# Patient Record
Sex: FEMALE | Race: WHITE | Employment: UNEMPLOYED | URBAN - METROPOLITAN AREA
[De-identification: names, ages, dates, MRNs, and addresses within clinical notes are randomized per-mention and may not be internally consistent; named-entity substitution may affect disease eponyms.]

---

## 2019-07-23 ENCOUNTER — HOSPITAL ENCOUNTER (EMERGENCY)
Age: 39
Discharge: HOME | End: 2019-07-23
Attending: EMERGENCY MEDICINE | Admitting: EMERGENCY MEDICINE
Payer: MEDICARE

## 2019-07-23 VITALS
SYSTOLIC BLOOD PRESSURE: 173 MMHG | HEART RATE: 70 BPM | DIASTOLIC BLOOD PRESSURE: 98 MMHG | BODY MASS INDEX: 39.68 KG/M2 | HEIGHT: 72 IN | OXYGEN SATURATION: 96 % | TEMPERATURE: 98.1 F | RESPIRATION RATE: 16 BRPM | WEIGHT: 293 LBS

## 2019-07-23 DIAGNOSIS — K08.89 TOOTHACHE: ICD-10-CM

## 2019-07-23 DIAGNOSIS — K02.9 DENTAL CARIES: Primary | ICD-10-CM

## 2019-07-23 PROCEDURE — 99281 EMR DPT VST MAYX REQ PHY/QHP: CPT

## 2019-07-23 RX ORDER — IBUPROFEN 800 MG/1
800 TABLET ORAL EVERY 8 HOURS
Qty: 15 TAB | Refills: 0 | Status: SHIPPED | OUTPATIENT
Start: 2019-07-23 | End: 2019-07-28

## 2019-07-23 RX ORDER — LIDOCAINE HYDROCHLORIDE 20 MG/ML
SOLUTION OROPHARYNGEAL
Qty: 200 ML | Refills: 0 | Status: SHIPPED | OUTPATIENT
Start: 2019-07-23

## 2019-07-23 RX ORDER — OXYCODONE AND ACETAMINOPHEN 5; 325 MG/1; MG/1
1 TABLET ORAL
Qty: 12 TAB | Refills: 0 | Status: SHIPPED | OUTPATIENT
Start: 2019-07-23 | End: 2019-07-26

## 2019-07-23 RX ORDER — CLARITHROMYCIN 500 MG/1
500 TABLET, FILM COATED ORAL 2 TIMES DAILY
Qty: 20 TAB | Refills: 0 | Status: SHIPPED | OUTPATIENT
Start: 2019-07-23 | End: 2019-08-02

## 2019-07-23 NOTE — ED PROVIDER NOTES
EMERGENCY DEPARTMENT HISTORY AND PHYSICAL EXAM    Date: 7/23/2019  Patient Name: Kathia Braun    History of Presenting Illness     Chief Complaint   Patient presents with    Dental Pain         History Provided By: Patient and EMS      Additional History (Context): Kathia Braun is a 37 y.o. female with obesity who presents with right upper and lower dental pain x1 day. Denies trauma. Says she has a dental appointment tomorrow. Is scheduled for a dental extraction next week for her upper 3 teeth and then to be fitted for dentures. Denies fever drooling or trismus. Last menstrual was July 8. PCP: Arron Sterling MD    Current Outpatient Medications   Medication Sig Dispense Refill    lidocaine (LIDOCAINE VISCOUS) 2 % solution Put 10 mL in mouth and swish and spit out QAC and at bedtime 200 mL 0    ibuprofen (MOTRIN) 800 mg tablet Take 1 Tab by mouth every eight (8) hours for 5 days. 15 Tab 0    oxyCODONE-acetaminophen (PERCOCET) 5-325 mg per tablet Take 1 Tab by mouth every six (6) hours as needed for Pain for up to 3 days. Max Daily Amount: 4 Tabs. Take 1 tablet every 6 hours as needed for pain control. 12 Tab 0    clarithromycin (BIAXIN) 500 mg tablet Take 1 Tab by mouth two (2) times a day for 10 days. 20 Tab 0       Past History     Past Medical History:  History reviewed. No pertinent past medical history. Past Surgical History:  History reviewed. No pertinent surgical history. Family History:  History reviewed. No pertinent family history. Social History:  Social History     Tobacco Use    Smoking status: Not on file   Substance Use Topics    Alcohol use: Not on file    Drug use: Not on file       Allergies: Allergies   Allergen Reactions    Pcn [Penicillins] Anaphylaxis         Review of Systems   Review of Systems   Constitutional: Negative for fever. HENT: Positive for dental problem. Negative for drooling.       All Other Systems Negative  Physical Exam     Vitals: 07/23/19 1746   BP: (!) 173/98   Pulse: 70   Resp: 16   Temp: 98.1 °F (36.7 °C)   SpO2: 95%   Weight: 136.1 kg (300 lb)   Height: 6' (1.829 m)     Physical Exam   Constitutional: She is oriented to person, place, and time. She appears well-developed. She appears distressed. HENT:   Head: Normocephalic and atraumatic. Dental: First second premolars are eroded tender and first molar is nearly absent. Lower right second premolar is eroded and tender. No adjacent gingival fluctuance palpated. No drooling or trismus. Eyes: Pupils are equal, round, and reactive to light. Neck: No JVD present. No tracheal deviation present. No thyromegaly present. Cardiovascular: Normal rate, regular rhythm and normal heart sounds. Exam reveals no gallop and no friction rub. No murmur heard. Pulmonary/Chest: Effort normal and breath sounds normal. No stridor. No respiratory distress. She has no wheezes. She has no rales. She exhibits no tenderness. Abdominal: Soft. She exhibits no distension and no mass. There is no tenderness. There is no rebound and no guarding. Musculoskeletal: She exhibits no edema or tenderness. Lymphadenopathy:     She has no cervical adenopathy. Neurological: She is alert and oriented to person, place, and time. Skin: Skin is warm and dry. No rash noted. No erythema. No pallor. Psychiatric: She has a normal mood and affect. Her behavior is normal. Thought content normal.   Nursing note and vitals reviewed. Diagnostic Study Results     Labs -   No results found for this or any previous visit (from the past 12 hour(s)). Radiologic Studies -   No orders to display     CT Results  (Last 48 hours)    None        CXR Results  (Last 48 hours)    None            Medical Decision Making   I am the first provider for this patient. I reviewed the vital signs, available nursing notes, past medical history, past surgical history, family history and social history.     Vital Signs-Reviewed the patient's vital signs. Records Reviewed: Nursing Notes    Procedures:  Procedures    Provider Notes (Medical Decision Making): Treat dental caries pain. Follow-up with dental.    MED RECONCILIATION:  No current facility-administered medications for this encounter. Current Outpatient Medications   Medication Sig    lidocaine (LIDOCAINE VISCOUS) 2 % solution Put 10 mL in mouth and swish and spit out QAC and at bedtime    ibuprofen (MOTRIN) 800 mg tablet Take 1 Tab by mouth every eight (8) hours for 5 days.  oxyCODONE-acetaminophen (PERCOCET) 5-325 mg per tablet Take 1 Tab by mouth every six (6) hours as needed for Pain for up to 3 days. Max Daily Amount: 4 Tabs. Take 1 tablet every 6 hours as needed for pain control.  clarithromycin (BIAXIN) 500 mg tablet Take 1 Tab by mouth two (2) times a day for 10 days. Disposition:  home    DISCHARGE NOTE:   6:02 PM    Pt has been reexamined. Patient has no new complaints, changes, or physical findings. Care plan outlined and precautions discussed. Results of exam were reviewed with the patient. All medications were reviewed with the patient; will d/c home with see below. All of pt's questions and concerns were addressed. Patient was instructed and agrees to follow up with dental, as well as to return to the ED upon further deterioration. Patient is ready to go home.     Follow-up Information     Follow up With Specialties Details Why Contact Info    [facility]  Schedule an appointment as soon as possible for a visit in 1 day  [address]  Schedule an appointment as soon as possible for a visit in 1 day  Danielle Cooper [phone] [facility]  Schedule an appointment as soon as possible for a visit in 1 day  [address] [facility] [id number] [address] [facility] [address] [facility]  Schedule an appointment as soon as possible for a visit in 1 day  [phone] [facility]    [address]  Schedule an appointment as soon as possible for a visit in 1 day  [address] [address]  [phone]    [facility] EMERGENCY DEPT Emergency Medicine  If symptoms worsen return immediately [address]  [phone]          Current Discharge Medication List      START taking these medications    Details   lidocaine (LIDOCAINE VISCOUS) 2 % solution Put 10 mL in mouth and swish and spit out QAC and at bedtime  Qty: 200 mL, Refills: 0      ibuprofen (MOTRIN) 800 mg tablet Take 1 Tab by mouth every eight (8) hours for 5 days. Qty: 15 Tab, Refills: 0      oxyCODONE-acetaminophen (PERCOCET) 5-325 mg per tablet Take 1 Tab by mouth every six (6) hours as needed for Pain for up to 3 days. Max Daily Amount: 4 Tabs. Take 1 tablet every 6 hours as needed for pain control. Qty: 12 Tab, Refills: 0    Associated Diagnoses: Dental caries; Toothache      clarithromycin (BIAXIN) 500 mg tablet Take 1 Tab by mouth two (2) times a day for 10 days. Qty: 20 Tab, Refills: 0             Diagnosis     Clinical Impression:   1. Dental caries    2.  Toothache

## 2019-07-23 NOTE — DISCHARGE INSTRUCTIONS
Patient Education        Tooth and Gum Pain: Care Instructions  Your Care Instructions    The most common causes of dental pain are tooth decay and gum disease. Pain can also be caused by an infection of the tooth (abscess) or the gums. Or you may have pain from a broken or cracked tooth. Other causes of pain include infection and damage to a tooth from nervous grinding of your teeth. A wisdom tooth can be painful when it is coming in but cannot break through the gum. It can also be painful when the tooth is only partway in and extra gum tissue has formed around it. The tissue can get inflamed (pericoronitis), and sometimes it gets infected. Prompt dental care can help find the cause of your toothache and keep the tooth from dying or gum disease from getting worse. Self-care at home may reduce your pain and discomfort. Follow-up care is a key part of your treatment and safety. Be sure to make and go to all appointments, and call your dentist or doctor if you are having problems. It's also a good idea to know your test results and keep a list of the medicines you take. How can you care for yourself at home? · To reduce pain and facial swelling, put an ice or cold pack on the outside of your cheek for 10 to 20 minutes at a time. Put a thin cloth between the ice and your skin. Do not use heat. · If your doctor prescribed antibiotics, take them as directed. Do not stop taking them just because you feel better. You need to take the full course of antibiotics. · Ask your doctor if you can take an over-the-counter pain medicine, such as acetaminophen (Tylenol), ibuprofen (Advil, Motrin), or naproxen (Aleve). Be safe with medicines. Read and follow all instructions on the label. · Avoid very hot, cold, or sweet foods and drinks if they increase your pain. · Rinse your mouth with warm salt water every 2 hours to help relieve pain and swelling. Mix 1 teaspoon of salt in 8 ounces of water.   · Talk to your dentist about using special toothpaste for sensitive teeth. To reduce pain on contact with heat or cold or when brushing, brush with this toothpaste regularly or rub a small amount of the paste on the sensitive area with a clean finger 2 or 3 times a day. Floss gently between your teeth. · Do not smoke or use spit tobacco. Tobacco use can make gum problems worse, decreases your ability to fight infection in your gums, and delays healing. If you need help quitting, talk to your doctor about stop-smoking programs and medicines. These can increase your chances of quitting for good. When should you call for help? Call 911 anytime you think you may need emergency care. For example, call if:    · You have trouble breathing.    Call your dentist or doctor now or seek immediate medical care if:    · You have signs of infection, such as:  ? Increased pain, swelling, warmth, or redness. ? Red streaks leading from the area. ? Pus draining from the area. ? A fever.    Watch closely for changes in your health, and be sure to contact your doctor if:    · You do not get better as expected. Where can you learn more? Go to http://shell-ivon.info/. Enter [id number] [id number] in the search box to learn more about \"Tooth and Gum Pain: Care Instructions. \"  Current as of: October 3, 2018  Content Version: 12.1  © 3764-3415 [employer]. Care instructions adapted under license by [employer] (which disclaims liability or warranty for this information). If you have questions about a medical condition or this instruction, always ask your healthcare professional. [place] [address] any warranty or liability for your use of this information. Patient Education        Tooth Extraction: Care Instructions  Your Care Instructions  Tooth extraction is the complete removal of a tooth, from the part of the tooth that you can see to the roots that are in the jawbone.  Damage caused by tooth decay is the most common reason for a tooth's extraction. Other reasons for removing a tooth include infection or injury. Removing the tooth can help keep an infection from spreading to other parts of the mouth. And some teeth may be removed to prevent or correct crowding in the mouth. Your dentist or an oral surgeon, who specializes in surgeries of the mouth, can remove a tooth. It can be done in the dentist's or oral surgeon's office. The dentist first numbs the area around the tooth. You may also get medicine to help you relax. The dentist uses a special tool to grasp the tooth and lift it out of the tooth socket. You may feel a tug on the tooth as it is being removed. If the tooth breaks while being pulled, or if it doesn't come out in one piece, the dentist uses other tools to remove the rest of the tooth. After the tooth comes out, you will be given a piece of gauze to bite down on. This will help stop bleeding. You may need stitches. You will be told if and when you should come back to have the stitches removed. You may have some pain, bleeding, or swelling afterward. The dentist may give you medicine for pain. The pain should steadily decrease in the days after the extraction. A blood clot will form in the tooth socket after the extraction. The clot protects the bone during healing. If that blood clot gets loose or comes out of the socket, you may have a dry socket, which exposes the bone. A dry socket may last for several days and can cause severe pain. If you get a dry socket, your dentist can treat it with medicine. You and your dentist may want to discuss options to replace the removed tooth. Options include an implant, a denture, or a bridge. Follow-up care is a key part of your treatment and safety. Be sure to make and go to all appointments, and call your dentist if you are having problems. It's also a good idea to know your test results and keep a list of the medicines you take.   How can you care for yourself at home? · While your mouth is numb, be careful not to bite your tongue or the inside of your cheek or lip. · Be safe with medicines. Read and follow all instructions on the label. ? If the dentist gave you a prescription medicine for pain, take it as prescribed. ? If you are not taking a prescription pain medicine, ask your dentist if you can take an over-the-counter medicine. · If your dentist prescribed antibiotics, take them as directed. Do not stop taking them just because you feel better. You need to take the full course of antibiotics. · If you have bleeding, bite gently on a gauze pad. Change the pad as it becomes soaked with blood. · After 24 hours, rinse your mouth gently with warm salt water several times a day. Your dentist may recommend other mouth rinses if needed. Do not rinse hard. This can loosen the blood clot and delay healing. · Avoid rubbing the area with your tongue. And don't use a straw for the first few days. Both of these actions can loosen the blood clot and delay healing. · Avoid chewing in the area where the tooth was removed until your mouth heals. Soft foods like gelatin or soup might be easier to eat and may help you heal.  · If needed, put ice or a cold pack on your cheek for 10 to 20 minutes at a time. Try to do this every 1 to 2 hours for the next 3 days (when you are awake) or until the swelling goes down. Put a thin cloth between the ice and your skin. · Do not smoke or use spit tobacco for at least 24 hours after the extraction. Tobacco use can delay healing. When should you call for help? Call 911 anytime you think you may need emergency care.  For example, call if:    · You passed out (lost consciousness).     · You have trouble breathing.    Call your dentist now or seek immediate medical care if:    · You have pain that does not get better after you take pain medicine.     · You have loose stitches, or your incision comes open.     · You have new or more bleeding from the site.     · You have signs of infection, such as:  ? Increased pain, swelling, warmth, or redness. ? Red streaks leading from the area. ? Pus draining from the area. ? A fever.    Watch closely for changes in your health, and be sure to contact your dentist if you have any problems. Where can you learn more? Go to http://shell-ivon.info/. Enter Juan Ramon Vaz in the search box to learn more about \"Tooth Extraction: Care Instructions. \"  Current as of: October 3, 2018  Content Version: 12.1  © 6816-2309 [employer]. Care instructions adapted under license by [employer] (which disclaims liability or warranty for this information). If you have questions about a medical condition or this instruction, always ask your healthcare professional. [place] [address] any warranty or liability for your use of this information. Patient Education        Periodontal Conditions: Care Instructions  Your Care Instructions    Periodontal conditions affect the gums, bone, and tissue that surround and support the teeth. The most common problems are caused by plaque. Plaque is a thin film of bacteria that sticks to teeth above and below the gum line. It can build up and harden into tartar. The bacteria in plaque and tartar can cause gum disease. Gingivitis is a disease that affects the gums (gingiva). The gums are the soft tissue that surrounds the teeth. Gingivitis causes red, swollen, tender gums that bleed easily when brushed, persistent bad breath, and sensitive teeth. Because it is not painful, many people do not get treatment when they should. Gingivitis can be reversed with good dental care. Periodontitis is a more advanced disease that affects more than the gums. The gums pull away from the teeth. This leaves deep pockets where bacteria can grow. The disease can damage the bones that support the teeth. The teeth may get loose and fall out.   A periodontal condition should be treated as soon as it is found. Finding gum problems early, treating them right away, and having regular checkups bring the best results. You can treat mild periodontal conditions by brushing and flossing your teeth every day. Your dentist may prescribe a mouthwash to kill the bacteria that can damage teeth and gums. Your dentist may have you take antibiotics to treat infection from moderate periodontal disease. If your gums have pulled away from your teeth, you may need cleaning between the teeth and gums right down to the teeth roots. This is called root planing and scaling. If you have severe periodontal disease, you may need surgery to remove diseased gum tissue or repair bone damage. Follow-up care is a key part of your treatment and safety. Be sure to make and go to all appointments, and call your dentist if you are having problems. It's also a good idea to know your test results and keep a list of the medicines you take. How can you care for yourself at home? · If your dentist prescribed antibiotics, take them as directed. Do not stop taking them just because you feel better. You need to take the full course of antibiotics. · Brush your teeth twice a day, in the morning and at night. ? Use a toothbrush with soft, rounded-end bristles and a head that is small enough to reach all parts of your teeth and mouth. Replace your toothbrush every 3 to 4 months. ? Use a fluoride toothpaste. ? Place the brush at a 45-degree angle where the teeth meet the gums. Press firmly, and gently rock the brush back and forth using small circular movements. ? Brush chewing surfaces vigorously with short back-and-forth strokes. ? Brush your tongue from back to front. · Floss at least once a day. Choose the type and flavor that you like best.  · Have your teeth cleaned by a professional at least twice a year.   · Ask your dentist about using an antibacterial mouthwash to help reduce bacteria. · Rinse your mouth with water or chew sugar-free gum after meals if you can't brush your teeth. · Do not smoke or use smokeless tobacco. Tobacco use can cause periodontal disease. When should you call for help? Call your dentist now or seek immediate medical care if:    · You have symptoms of infection, such as:  ? Increased pain, swelling, warmth, or redness. ? Red streaks leading from the area. ? Pus draining from the area. ? A fever.    Watch closely for changes in your health, and be sure to contact your dentist if:    · You have new or worse tooth pain.     · You do not get better as expected. Where can you learn more? Go to http://shell-ivon.info/. Enter X438 in the search box to learn more about \"Periodontal Conditions: Care Instructions. \"  Current as of: October 3, 2018  Content Version: 12.1  © 2821-8733 [employer], [employer]. Care instructions adapted under license by [employer] (which disclaims liability or warranty for this information). If you have questions about a medical condition or this instruction, always ask your healthcare professional. [place] [address] any warranty or liability for your use of this information.